# Patient Record
Sex: FEMALE | Race: WHITE | NOT HISPANIC OR LATINO | URBAN - METROPOLITAN AREA
[De-identification: names, ages, dates, MRNs, and addresses within clinical notes are randomized per-mention and may not be internally consistent; named-entity substitution may affect disease eponyms.]

---

## 2022-06-30 ENCOUNTER — EMERGENCY (EMERGENCY)
Facility: HOSPITAL | Age: 27
LOS: 1 days | Discharge: HOME | End: 2022-06-30
Admitting: EMERGENCY MEDICINE

## 2022-06-30 VITALS
HEIGHT: 64 IN | TEMPERATURE: 99 F | RESPIRATION RATE: 16 BRPM | HEART RATE: 93 BPM | DIASTOLIC BLOOD PRESSURE: 67 MMHG | WEIGHT: 110.01 LBS | OXYGEN SATURATION: 97 % | SYSTOLIC BLOOD PRESSURE: 119 MMHG

## 2022-06-30 VITALS
SYSTOLIC BLOOD PRESSURE: 108 MMHG | OXYGEN SATURATION: 98 % | DIASTOLIC BLOOD PRESSURE: 63 MMHG | RESPIRATION RATE: 16 BRPM | TEMPERATURE: 99 F | HEART RATE: 80 BPM
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
## 2022-06-30 DIAGNOSIS — Z23 ENCOUNTER FOR IMMUNIZATION: ICD-10-CM

## 2022-06-30 DIAGNOSIS — S61.452A OPEN BITE OF LEFT HAND, INITIAL ENCOUNTER: ICD-10-CM

## 2022-06-30 DIAGNOSIS — S61.451A OPEN BITE OF RIGHT HAND, INITIAL ENCOUNTER: ICD-10-CM

## 2022-06-30 DIAGNOSIS — S01.81XA LACERATION WITHOUT FOREIGN BODY OF OTHER PART OF HEAD, INITIAL ENCOUNTER: ICD-10-CM

## 2022-06-30 DIAGNOSIS — S60.311A ABRASION OF RIGHT THUMB, INITIAL ENCOUNTER: ICD-10-CM

## 2022-06-30 DIAGNOSIS — S01.85XA OPEN BITE OF OTHER PART OF HEAD, INITIAL ENCOUNTER: ICD-10-CM

## 2022-06-30 DIAGNOSIS — E04.1 NONTOXIC SINGLE THYROID NODULE: ICD-10-CM
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
## 2022-06-30 DIAGNOSIS — S60.312A ABRASION OF LEFT THUMB, INITIAL ENCOUNTER: ICD-10-CM

## 2022-06-30 PROCEDURE — 99284 EMERGENCY DEPT VISIT MOD MDM: CPT | Mod: 25

## 2022-06-30 PROCEDURE — 73130 X-RAY EXAM OF HAND: CPT | Mod: 26,RT

## 2022-06-30 RX ORDER — TETANUS TOXOID, REDUCED DIPHTHERIA TOXOID AND ACELLULAR PERTUSSIS VACCINE, ADSORBED 5; 2.5; 8; 8; 2.5 [IU]/.5ML; [IU]/.5ML; UG/.5ML; UG/.5ML; UG/.5ML
0.5 SUSPENSION INTRAMUSCULAR ONCE
Refills: 0 | Status: COMPLETED | OUTPATIENT
Start: 2022-06-30 | End: 2022-06-30

## 2022-06-30 RX ORDER — KETOROLAC TROMETHAMINE 30 MG/ML
15 SYRINGE (ML) INJECTION ONCE
Refills: 0 | Status: DISCONTINUED | OUTPATIENT
Start: 2022-06-30 | End: 2022-06-30

## 2022-06-30 RX ORDER — AMPICILLIN SODIUM AND SULBACTAM SODIUM 250; 125 MG/ML; MG/ML
3 INJECTION, POWDER, FOR SUSPENSION INTRAMUSCULAR; INTRAVENOUS ONCE
Refills: 0 | Status: COMPLETED | OUTPATIENT
Start: 2022-06-30 | End: 2022-06-30

## 2022-06-30 RX ORDER — BACITRACIN ZINC 500 UNIT/G
1 OINTMENT IN PACKET (EA) TOPICAL
Qty: 1 | Refills: 0
Start: 2022-06-30 | End: 2022-07-06

## 2022-06-30 RX ADMIN — Medication 15 MILLIGRAM(S): at 22:05

## 2022-06-30 RX ADMIN — TETANUS TOXOID, REDUCED DIPHTHERIA TOXOID AND ACELLULAR PERTUSSIS VACCINE, ADSORBED 0.5 MILLILITER(S): 5; 2.5; 8; 8; 2.5 SUSPENSION INTRAMUSCULAR at 22:06

## 2022-06-30 RX ADMIN — AMPICILLIN SODIUM AND SULBACTAM SODIUM 200 GRAM(S): 250; 125 INJECTION, POWDER, FOR SUSPENSION INTRAMUSCULAR; INTRAVENOUS at 22:05

## 2022-06-30 NOTE — ED PROVIDER NOTE - CLINICAL SUMMARY MEDICAL DECISION MAKING FREE TEXT BOX
pt with multiple dog bites to the chin and hands.  Seen at  earlier s/p lac repair to the chin and prescribed augmentin.  b/l hands with abrasion and puncture wound, copiously power irrigated and soaked in diluted betadine solution at bedside, FROM with no joint involvement, xray wnl, s/p dose of IV unasyn and local wound care provided, given additional supplies for bandage change, given instruction for wound care and return in 1d for wound check, high risk of tenosynovitis given puncture/abrasion over dorsum MCP region, given strict return precautions, tetanus updated, hand f/u provided, pt verbalized understanding

## 2022-06-30 NOTE — ED PROVIDER NOTE - PATIENT PORTAL LINK FT
You can access the FollowMyHealth Patient Portal offered by [facility] by registering at the following website: [email or web address]. By joining [employer]’s FollowMyHealth portal, you will also be able to view your health information using other applications (apps) compatible with our system.

## 2022-06-30 NOTE — ED PROVIDER NOTE - PROVIDER TOKENS
FREE:[LAST:[return in 1-2 days for wound check],PHONE:[(   )    -],FAX:[(   )    -]],PROVIDER:[TOKEN:[[id number]]]

## 2022-06-30 NOTE — ED PROVIDER NOTE - OBJECTIVE STATEMENT
28 yo F with no known PMHx, RHD, presenting c/o dog bites to b/l hands and chin region.  Pt reports sitting by a park bench and a small black dog walked by with his owner and got startled and started biting her. Sustained bite wound to the chin and subsequently tried to get the dog off with her hands and sustained lacerations to the b/l hands.  Was seen at  and s/p lac repair to the chin and subsequently sent in for further evaluation.  Reports pain and swelling and bruising to the R hand with tingling sensation. Denies fever, chills, FB sensation, change in ROM/sensation, redness, swelling, purulent d/c, N/V, HA, dizziness, LOC, CP, SOB, and focal weakness

## 2022-06-30 NOTE — ED PROVIDER NOTE - NSFOLLOWUPINSTRUCTIONS_ED_ALL_ED_FT
Animal Bite, Adult      Animal bites range from mild to serious. An animal bite can result in any of these injuries:  •A scratch.      •A deep, open cut.      •A puncture of the skin.      •A crush injury.      •Tearing away of the skin or a body part.      •A bone injury.      A small bite from a house pet is usually less serious than a bite from a stray or wild animal, such as a raccoon, ulrich, skunk, or bat. That is because stray and wild animals have a higher risk of carrying a serious infection called rabies, which can be passed to humans through a bite.      What increases the risk?    You are more likely to be bitten by an animal if:  •You are around unfamiliar pets.      •You disturb an animal when it is eating, sleeping, or caring for its babies.      •You are outdoors in a place where small, wild animals roam freely.        What are the signs or symptoms?    Common symptoms of an animal bite include:  •Pain.      •Bleeding.      •Swelling.      •Bruising.        How is this diagnosed?    This condition may be diagnosed based on a physical exam and medical history. Your health care provider will examine your wound and ask for details about the animal and how the bite happened. You may also have tests, such as:  •Blood tests to check for infection.      •X-rays to check for damage to bones or joints.      •Taking a fluid sample from your wound and checking it for infection (culture test).        How is this treated?    Treatment varies depending on the type of animal, where the bite is on your body, and your medical history. Treatment may include:  •Caring for the wound. This often includes cleaning the wound, rinsing out (flushing) the wound with saline solution, and applying a bandage (dressing). In some cases, the wound may be closed with stitches (sutures), staples, skin glue, or adhesive strips.      •Antibiotic medicine to prevent or treat infection. This medicine may be prescribed in pill or ointment form. If the bite area becomes infected, the medicine may be given through an IV.      •A tetanus shot to prevent tetanus infection.      •Rabies treatment to prevent rabies infection. This will be done if the animal could have rabies.      •Surgery. This may be done if a bite gets infected or if there is damage that needs to be repaired.        Follow these instructions at home:      Wound care    •Follow instructions from your health care provider about how to take care of your wound. Make sure you:  •Wash your hands with soap and water before you change your dressing. If soap and water are not available, use hand .      •Change your dressing as told by your health care provider.      •Leave sutures, skin glue, or adhesive strips in place. These skin closures may need to stay in place for 2 weeks or longer. If adhesive strip edges start to loosen and curl up, you may trim the loose edges. Do not remove adhesive strips completely unless your health care provider tells you to do that.      •Check your wound every day for signs of infection. Check for:  •More redness, swelling, or pain.      •More fluid or blood.      •Warmth.      •Pus or a bad smell.        Medicines     •Take or apply over-the-counter and prescription medicines only as told by your health care provider.      •If you were prescribed an antibiotic, take or apply it as told by your health care provider. Do not stop using the antibiotic even if your condition improves.        General instructions      •Keep the injured area raised (elevated) above the level of your heart while you are sitting or lying down, if this is possible.    •If directed, put ice on the injured area.  •Put ice in a plastic bag.      •Place a towel between your skin and the bag.      •Leave the ice on for 20 minutes, 2–3 times per day.        •Keep all follow-up visits as told by your health care provider. This is important.        Contact a health care provider if:    •You have more redness, swelling, or pain around your wound.      •Your wound feels warm to the touch.      •You have a fever or chills.      •You have a general feeling of sickness (malaise).      •You feel nauseous or you vomit.      •You have pain that does not get better.        Get help right away if:    •You have a red streak that leads away from your wound.      •You have non-clear fluid or more blood coming from your wound.      •There is pus or a bad smell coming from your wound.      •You have trouble moving your injured area.      •You have numbness or tingling that extends beyond the wound.        Summary    •Animal bites can range from mild to serious. An animal bite can cause a scratch on the skin, a deep open cut, a puncture of the skin, a crush injury, tearing away of the skin or a body part, or a bone injury.      •Your health care provider will examine your wound and ask for details about the animal and how the bite happened.      •You may also have tests such as a blood test, X-ray, or testing of a fluid sample from your wound (culture test).      •Treatment may include wound care, antibiotic medicine, a tetanus shot, and rabies treatment if the animal could have rabies.

## 2022-06-30 NOTE — ED PROCEDURE NOTE - GENERAL PROCEDURE DETAILS
multiple rings removed from b/l fingers with lubricants, wound subsequently soaked in diluted betadine solution and irrigated, bacitracin and telfa applied, steristrip loosely applied

## 2022-06-30 NOTE — ED ADULT NURSE NOTE - OBJECTIVE STATEMENT
Patient sustained dog bite to 4th finger on ring finger and back of hand. Patient reports dog is up to date on shots.

## 2022-06-30 NOTE — ED PROVIDER NOTE - PHYSICAL EXAMINATION
Gen - WDWN, NAD, comfortable and non-toxic appearing  Skin - warm, dry,   HEENT - AT/NC, no nasal discharge, airway patent, neck supple and FROM  CV - S1S2, R/R/R  Resp - CTAB, no r/r/w  GI - soft, ND, NT, no CVAT b/l   MS - No midline spinal tenderness or step off on palpation  Neuro - AxOx3, ambulatory without gait disturbance Gen - WDWN F, NAD, comfortable and non-toxic appearing  Skin - warm, dry, 2 bite wounds to the chin repaired with 1 suture each, 1cm curvilinear superficial laceration to the R 4th distal finger not involving nail bed, mild abrasions to b/l thumb pad, abrasion to the mid R dorsum hand (3rd MCP region) with edema and TTP, no fluctuance, bleeding or dc   HEENT - AT/NC, no nasal discharge, airway patent, neck supple and FROM  CV - S1S2, R/R/R  Resp - CTAB, no r/r/w  GI - soft, ND, NT, no CVAT b/l   MS - b/l hands wounds per skin section, NV intact, FROM, +SILT, symmetric pulses and brisk cap refills b/l, strength intact, no joint stiffness or streaking, No midline spinal tenderness or step off on palpation  Neuro - AxOx3, ambulatory without gait disturbance

## 2022-06-30 NOTE — ED PROVIDER NOTE - LAB INTERPRETATION
Xray (wet reads) interpreted by MARIA G KELLER   xray hand - +soft tissue swelling. no acute fx or dislocation, joint space intact, no effusion noted. No foreign body noted

## 2022-06-30 NOTE — ED PROVIDER NOTE - CARE PROVIDER_API CALL
return in 1-2 days for wound check,   Phone: (   )    -  Fax: (   )    -  Follow Up Time:     Nathan Felix)  Plastic Surgery  [address], [address]  [place] [address]  Phone: [phone]  Fax: [phone]  Follow Up Time:

## 2022-06-30 NOTE — ED ADULT TRIAGE NOTE - CHIEF COMPLAINT QUOTE
Pt. walk in from  for Tdap after dog bite. Pt. was bit by dog at park, as per owner dog is fully vaccinated. Laceration to chine repaired by , abrasions to b/l hands.

## 2023-06-09 NOTE — ED ADULT TRIAGE NOTE - GLASGOW COMA SCALE: SCORE, MLM
Subjective     History was provided by his mother.    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  Sertraline 100 had been effective but is no longer as effective.  He feels stressed, but no panic attacks.  He is taking a break from his [employer] studies to work full time.    Objective     /83   Pulse 86   Wt 133 kg (293 lb 4.8 oz)   BMI 42.08 kg/m²   @[email or web address]@    General:  Well-appearing, well hydrated and in no acute distress                                     Assessment/Plan     Justino Garcia is well-appearing, well-hydrated, in no acute distress, and afebrile at today's visit.    His clinical presentation and examination indicates the diagnosis of anxiety    His treatment plan includes increase dose of sertraline to 125 mg daily with phone or office follow up in 1 month    Supportive care measures and expected course of illness reviewed.    Follow up promptly for worsening or prolonged illness.    Josesito Toney MD MPH     15